# Patient Record
Sex: FEMALE | URBAN - METROPOLITAN AREA
[De-identification: names, ages, dates, MRNs, and addresses within clinical notes are randomized per-mention and may not be internally consistent; named-entity substitution may affect disease eponyms.]

---

## 2019-04-26 ENCOUNTER — APPOINTMENT (RX ONLY)
Dept: URBAN - METROPOLITAN AREA CLINIC 106 | Facility: CLINIC | Age: 34
Setting detail: DERMATOLOGY
End: 2019-04-26

## 2019-04-26 DIAGNOSIS — L30.1 DYSHIDROSIS [POMPHOLYX]: ICD-10-CM

## 2019-04-26 PROCEDURE — 99213 OFFICE O/P EST LOW 20 MIN: CPT

## 2019-04-26 PROCEDURE — ? KOH PREP

## 2019-04-26 PROCEDURE — 87220 TISSUE EXAM FOR FUNGI: CPT

## 2019-04-26 PROCEDURE — ? PRESCRIPTION

## 2019-04-26 PROCEDURE — ? COUNSELING: TOPICAL STEROIDS

## 2019-04-26 PROCEDURE — ? TREATMENT REGIMEN

## 2019-04-26 RX ORDER — CLOBETASOL PROPIONATE 0.5 MG/G
CREAM TOPICAL
Qty: 1 | Refills: 1 | Status: ERX

## 2019-04-26 ASSESSMENT — LOCATION DETAILED DESCRIPTION DERM
LOCATION DETAILED: RIGHT MEDIAL PLANTAR MIDFOOT
LOCATION DETAILED: RIGHT MEDIAL DORSAL FOOT

## 2019-04-26 ASSESSMENT — LOCATION ZONE DERM: LOCATION ZONE: FEET

## 2019-04-26 ASSESSMENT — LOCATION SIMPLE DESCRIPTION DERM
LOCATION SIMPLE: RIGHT PLANTAR SURFACE
LOCATION SIMPLE: RIGHT FOOT

## 2019-04-26 NOTE — PROCEDURE: KOH PREP
Koh Procedure Text (Tissue Harvesting Technique): A 15-blade scalpel was used to scrape the skin. The skin scrapings were placed on a glass slide, covered with a coverslip and a KOH solution was applied.
Koh Intro Text (From The.....): A KOH prep was ordered and evaluated from the
Detail Level: Detailed
Showing: fungal hyphal elements: negative

## 2019-04-26 NOTE — PROCEDURE: TREATMENT REGIMEN
Initiate Treatment: clobetasol 0.05 % topical cream  Sig: Apply to rash on right foot two times a day x2-3 weeks
Plan: To stop all topicals 48 hours prior to follow-up
Discontinue Regimen: Fluocinonide ointmnet
Detail Level: Simple

## 2019-04-26 NOTE — HPI: RASH
What Type Of Note Output Would You Prefer (Optional)?: Bullet Format
How Severe Is Your Rash?: moderate
Is This A New Presentation, Or A Follow-Up?: Follow Up Rash
Additional History: status post fluocinonide ointmnet didn’t help

## 2019-05-31 ENCOUNTER — APPOINTMENT (RX ONLY)
Dept: URBAN - METROPOLITAN AREA CLINIC 106 | Facility: CLINIC | Age: 34
Setting detail: DERMATOLOGY
End: 2019-05-31

## 2019-05-31 DIAGNOSIS — B35.3 TINEA PEDIS: ICD-10-CM

## 2019-05-31 PROCEDURE — 87220 TISSUE EXAM FOR FUNGI: CPT

## 2019-05-31 PROCEDURE — 99213 OFFICE O/P EST LOW 20 MIN: CPT

## 2019-05-31 PROCEDURE — ? KOH PREP

## 2019-05-31 PROCEDURE — ? DIAGNOSIS COMMENT

## 2019-05-31 PROCEDURE — ? TREATMENT REGIMEN

## 2019-05-31 PROCEDURE — ? PRESCRIPTION

## 2019-05-31 PROCEDURE — ? COUNSELING

## 2019-05-31 RX ORDER — TERBINAFINE HYDROCHLORIDE 250 MG/1
TABLET ORAL QD
Qty: 30 | Refills: 0 | Status: ERX

## 2019-05-31 RX ORDER — TERBINAFINE HYDROCHLORIDE 1 G/100G
CREAM TOPICAL BID
Qty: 2 | Refills: 1 | Status: ERX

## 2019-05-31 ASSESSMENT — LOCATION DETAILED DESCRIPTION DERM: LOCATION DETAILED: RIGHT MEDIAL PLANTAR MIDFOOT

## 2019-05-31 ASSESSMENT — LOCATION ZONE DERM: LOCATION ZONE: FEET

## 2019-05-31 ASSESSMENT — LOCATION SIMPLE DESCRIPTION DERM: LOCATION SIMPLE: RIGHT PLANTAR SURFACE

## 2019-05-31 NOTE — PROCEDURE: MIPS QUALITY
Quality 226: Preventive Care And Screening: Tobacco Use: Screening And Cessation Intervention: Patient screened for tobacco use and is an ex/non-smoker
Detail Level: Detailed
Quality 131: Pain Assessment And Follow-Up: Pain assessment NOT documented as being performed, documentation the patient is not eligible for a pain assessment using a standardized tool

## 2019-05-31 NOTE — PROCEDURE: DIAGNOSIS COMMENT
Detail Level: Simple
Comment: Dermatophytid rxn-hands & L foot clear—pt with no hx of hepatic, renal, heme dz

## 2019-06-28 ENCOUNTER — APPOINTMENT (RX ONLY)
Dept: URBAN - METROPOLITAN AREA CLINIC 106 | Facility: CLINIC | Age: 34
Setting detail: DERMATOLOGY
End: 2019-06-28

## 2019-06-28 DIAGNOSIS — B35.3 TINEA PEDIS: ICD-10-CM | Status: RESOLVED

## 2019-06-28 DIAGNOSIS — L30.2 CUTANEOUS AUTOSENSITIZATION: ICD-10-CM | Status: RESOLVED

## 2019-06-28 PROCEDURE — ? TREATMENT REGIMEN

## 2019-06-28 PROCEDURE — 99213 OFFICE O/P EST LOW 20 MIN: CPT

## 2019-06-28 PROCEDURE — ? COUNSELING

## 2019-06-28 ASSESSMENT — SEVERITY ASSESSMENT: SEVERITY: CLEAR

## 2019-06-28 ASSESSMENT — LOCATION DETAILED DESCRIPTION DERM: LOCATION DETAILED: RIGHT DORSAL FOOT

## 2019-06-28 ASSESSMENT — LOCATION SIMPLE DESCRIPTION DERM: LOCATION SIMPLE: RIGHT FOOT

## 2019-06-28 ASSESSMENT — LOCATION ZONE DERM: LOCATION ZONE: FEET

## 2021-09-08 ENCOUNTER — APPOINTMENT (RX ONLY)
Dept: URBAN - METROPOLITAN AREA CLINIC 106 | Facility: CLINIC | Age: 36
Setting detail: DERMATOLOGY
End: 2021-09-08

## 2021-09-08 DIAGNOSIS — L81.4 OTHER MELANIN HYPERPIGMENTATION: ICD-10-CM

## 2021-09-08 DIAGNOSIS — L81.3 CAFÉ AU LAIT SPOTS: ICD-10-CM

## 2021-09-08 DIAGNOSIS — Q826 OTHER SPECIFIED ANOMALIES OF SKIN: ICD-10-CM

## 2021-09-08 DIAGNOSIS — D22 MELANOCYTIC NEVI: ICD-10-CM

## 2021-09-08 DIAGNOSIS — Q819 OTHER SPECIFIED ANOMALIES OF SKIN: ICD-10-CM

## 2021-09-08 DIAGNOSIS — Q828 OTHER SPECIFIED ANOMALIES OF SKIN: ICD-10-CM

## 2021-09-08 DIAGNOSIS — L24 IRRITANT CONTACT DERMATITIS: ICD-10-CM | Status: WELL CONTROLLED

## 2021-09-08 DIAGNOSIS — L259 CONTACT DERMATITIS AND OTHER ECZEMA, UNSPECIFIED CAUSE: ICD-10-CM | Status: WELL CONTROLLED

## 2021-09-08 PROBLEM — D22.5 MELANOCYTIC NEVI OF TRUNK: Status: ACTIVE | Noted: 2021-09-08

## 2021-09-08 PROBLEM — L30.8 OTHER SPECIFIED DERMATITIS: Status: ACTIVE | Noted: 2021-09-08

## 2021-09-08 PROBLEM — L85.8 OTHER SPECIFIED EPIDERMAL THICKENING: Status: ACTIVE | Noted: 2021-09-08

## 2021-09-08 PROBLEM — L24.9 IRRITANT CONTACT DERMATITIS, UNSPECIFIED CAUSE: Status: ACTIVE | Noted: 2021-09-08

## 2021-09-08 PROCEDURE — ? DIAGNOSIS COMMENT

## 2021-09-08 PROCEDURE — 99213 OFFICE O/P EST LOW 20 MIN: CPT

## 2021-09-08 PROCEDURE — ? PRESCRIPTION MEDICATION MANAGEMENT

## 2021-09-08 PROCEDURE — ? SUNSCREEN RECOMMENDATIONS

## 2021-09-08 PROCEDURE — ? COUNSELING

## 2021-09-08 PROCEDURE — ? COUNSELING: TOPICAL STEROIDS

## 2021-09-08 PROCEDURE — ? ADDITIONAL NOTES

## 2021-09-08 ASSESSMENT — LOCATION DETAILED DESCRIPTION DERM
LOCATION DETAILED: LEFT POPLITEAL SKIN
LOCATION DETAILED: LEFT PROXIMAL POSTERIOR UPPER ARM
LOCATION DETAILED: LEFT AXILLARY VAULT
LOCATION DETAILED: RIGHT MEDIAL UPPER BACK
LOCATION DETAILED: RIGHT PROXIMAL POSTERIOR UPPER ARM
LOCATION DETAILED: LEFT PROXIMAL DORSAL FOREARM
LOCATION DETAILED: RIGHT PROXIMAL RADIAL DORSAL FOREARM
LOCATION DETAILED: RIGHT AXILLARY VAULT

## 2021-09-08 ASSESSMENT — LOCATION SIMPLE DESCRIPTION DERM
LOCATION SIMPLE: LEFT FOREARM
LOCATION SIMPLE: LEFT POSTERIOR UPPER ARM
LOCATION SIMPLE: RIGHT UPPER BACK
LOCATION SIMPLE: RIGHT AXILLARY VAULT
LOCATION SIMPLE: RIGHT POSTERIOR UPPER ARM
LOCATION SIMPLE: RIGHT FOREARM
LOCATION SIMPLE: LEFT POPLITEAL SKIN
LOCATION SIMPLE: LEFT AXILLARY VAULT

## 2021-09-08 ASSESSMENT — BSA RASH: BSA RASH: 0

## 2021-09-08 ASSESSMENT — SEVERITY ASSESSMENT 2020: SEVERITY 2020: CLEAR

## 2021-09-08 ASSESSMENT — LOCATION ZONE DERM
LOCATION ZONE: AXILLAE
LOCATION ZONE: ARM
LOCATION ZONE: TRUNK
LOCATION ZONE: LEG

## 2021-09-08 NOTE — PROCEDURE: DIAGNOSIS COMMENT
Comment: By Hx, most likely due to antiperspirant
Render Risk Assessment In Note?: no
Detail Level: Detailed
Comment: Hx c/w an eczematous derm-clear on exam today

## 2021-09-08 NOTE — PROCEDURE: ADDITIONAL NOTES
Render Risk Assessment In Note?: no
Detail Level: Simple
Additional Notes: Recommended [employer] [place] deodorant-avoid anti-perspirants

## 2021-09-08 NOTE — PROCEDURE: PRESCRIPTION MEDICATION MANAGEMENT
Continue Regimen: Patient states she already has triamcinolone cream 0.1%-helps-use bid x 7-10 days prn flare
Render In Strict Bullet Format?: No
Detail Level: Zone
Plan: Patient given handout on Cerave cream and Cetaphil cream